# Patient Record
Sex: FEMALE | Race: WHITE | NOT HISPANIC OR LATINO | URBAN - METROPOLITAN AREA
[De-identification: names, ages, dates, MRNs, and addresses within clinical notes are randomized per-mention and may not be internally consistent; named-entity substitution may affect disease eponyms.]

---

## 2017-01-01 ENCOUNTER — APPOINTMENT (RX ONLY)
Dept: URBAN - METROPOLITAN AREA CLINIC 126 | Facility: CLINIC | Age: 77
Setting detail: DERMATOLOGY
End: 2017-01-01

## 2017-01-01 ENCOUNTER — APPOINTMENT (RX ONLY)
Dept: URBAN - METROPOLITAN AREA CLINIC 124 | Facility: CLINIC | Age: 77
Setting detail: DERMATOLOGY
End: 2017-01-01

## 2017-01-01 DIAGNOSIS — Z41.9 ENCOUNTER FOR PROCEDURE FOR PURPOSES OTHER THAN REMEDYING HEALTH STATE, UNSPECIFIED: ICD-10-CM

## 2017-01-01 DIAGNOSIS — L30.0 NUMMULAR DERMATITIS: ICD-10-CM

## 2017-01-01 DIAGNOSIS — L29.89 OTHER PRURITUS: ICD-10-CM

## 2017-01-01 DIAGNOSIS — L81.4 OTHER MELANIN HYPERPIGMENTATION: ICD-10-CM

## 2017-01-01 PROCEDURE — ? PRESCRIPTION

## 2017-01-01 PROCEDURE — 96372 THER/PROPH/DIAG INJ SC/IM: CPT

## 2017-01-01 PROCEDURE — ? INTRAMUSCULAR KENALOG

## 2017-01-01 PROCEDURE — ? FILLERS

## 2017-01-01 PROCEDURE — 99214 OFFICE O/P EST MOD 30 MIN: CPT | Mod: 25

## 2017-01-01 PROCEDURE — ? COUNSELING

## 2017-01-01 RX ORDER — CLOBETASOL PROPIONATE 0.5 MG/G
OINTMENT TOPICAL
Qty: 1 | Refills: 5 | Status: CANCELLED
Stop reason: CLARIF

## 2017-01-01 ASSESSMENT — LOCATION SIMPLE DESCRIPTION DERM
LOCATION SIMPLE: LEFT THIGH
LOCATION SIMPLE: LEFT FOREARM
LOCATION SIMPLE: LEFT SHOULDER
LOCATION SIMPLE: RIGHT BUTTOCK
LOCATION SIMPLE: RIGHT PRETIBIAL REGION
LOCATION SIMPLE: RIGHT THIGH
LOCATION SIMPLE: RIGHT UPPER BACK
LOCATION SIMPLE: RIGHT FOREARM
LOCATION SIMPLE: CHEST
LOCATION SIMPLE: LEFT POSTERIOR THIGH
LOCATION SIMPLE: LEFT UPPER BACK
LOCATION SIMPLE: RIGHT SHOULDER
LOCATION SIMPLE: RIGHT UPPER ARM
LOCATION SIMPLE: LEFT LOWER BACK
LOCATION SIMPLE: LEFT PRETIBIAL REGION

## 2017-01-01 ASSESSMENT — LOCATION DETAILED DESCRIPTION DERM
LOCATION DETAILED: LEFT POSTERIOR SHOULDER
LOCATION DETAILED: RIGHT INFERIOR UPPER BACK
LOCATION DETAILED: RIGHT ANTERIOR LATERAL PROXIMAL UPPER ARM
LOCATION DETAILED: LEFT INFERIOR UPPER BACK
LOCATION DETAILED: LEFT PROXIMAL DORSAL FOREARM
LOCATION DETAILED: LEFT INFERIOR MEDIAL MIDBACK
LOCATION DETAILED: RIGHT ANTERIOR PROXIMAL THIGH
LOCATION DETAILED: LEFT ANTERIOR SHOULDER
LOCATION DETAILED: RIGHT SUPERIOR UPPER BACK
LOCATION DETAILED: RIGHT SUPERIOR MEDIAL UPPER BACK
LOCATION DETAILED: RIGHT ANTERIOR SHOULDER
LOCATION DETAILED: RIGHT PROXIMAL DORSAL FOREARM
LOCATION DETAILED: RIGHT BUTTOCK
LOCATION DETAILED: LEFT PROXIMAL POSTERIOR THIGH
LOCATION DETAILED: RIGHT ANTERIOR DISTAL THIGH
LOCATION DETAILED: LEFT SUPERIOR MEDIAL UPPER BACK
LOCATION DETAILED: RIGHT DISTAL PRETIBIAL REGION
LOCATION DETAILED: LEFT ANTERIOR PROXIMAL THIGH
LOCATION DETAILED: LEFT ANTERIOR DISTAL THIGH
LOCATION DETAILED: RIGHT MEDIAL SUPERIOR CHEST
LOCATION DETAILED: LEFT DISTAL PRETIBIAL REGION

## 2017-01-01 ASSESSMENT — LOCATION ZONE DERM
LOCATION ZONE: ARM
LOCATION ZONE: TRUNK
LOCATION ZONE: LEG

## 2017-04-18 PROBLEM — Z41.9 ENCOUNTER FOR PROCEDURE FOR PURPOSES OTHER THAN REMEDYING HEALTH STATE, UNSPECIFIED: Status: ACTIVE | Noted: 2017-01-01

## 2017-04-18 NOTE — PROCEDURE: FILLERS
Jawline Filler Volume In Cc: 0
Filler: Restylane
Additional Area 1 Location: Corners of mouth
Consent: Written consent obtained. Risks include but not limited to bruising, beading, irregular texture, ulceration, infection, allergic reaction, scar formation, incomplete augmentation, temporary nature, procedural pain.
Marionette Lines Filler  Volume In Cc: 0.5
Detail Level: Simple
Post-Care Instructions: Patient instructed to apply ice to reduce swelling.
Map Statment: See [address] for Complete Details
Nasolabial Folds Filler Volume In Cc: 1
Use Map Statement For Sites (Optional): No

## 2017-04-24 PROBLEM — L81.4 OTHER MELANIN HYPERPIGMENTATION: Status: ACTIVE | Noted: 2017-01-01

## 2017-04-24 PROBLEM — L30.0 NUMMULAR DERMATITIS: Status: ACTIVE | Noted: 2017-01-01

## 2017-04-24 PROBLEM — L29.8 OTHER PRURITUS: Status: ACTIVE | Noted: 2017-01-01

## 2017-04-24 NOTE — PROCEDURE: INTRAMUSCULAR KENALOG
Expiration Date (Optional): [id number]
Total Volume (Ccs): 1.5
Concentration (Mg/Ml): 40.0
Administered By (Optional): DANTE
Consent: The risks of atrophy were reviewed with the patient.
Detail Level: Generalized
Lot # (Optional): [id number]
Kenalog Preparation: kenalog